# Patient Record
Sex: MALE | Race: OTHER | NOT HISPANIC OR LATINO
[De-identification: names, ages, dates, MRNs, and addresses within clinical notes are randomized per-mention and may not be internally consistent; named-entity substitution may affect disease eponyms.]

---
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
## 2025-01-25 NOTE — PROGRESS NOTE PEDS - PROBLEM SELECTOR PROBLEM 4
01/25/25 0900   Team Meeting   Meeting Type Daily Rounds   Team Members Present   Team Members Present Physician;Nurse   Physician Team Member Di/Yury   Nursing Team Member Nicole   Patient/Family Present   Patient Present No   Patient's Family Present No       AM rounds- denies SI/HI, ongoing depression. Flat affect. Social with select peers. Declined groups. Slept well.    Febrile neutropenia

## 2025-03-04 NOTE — PROGRESS NOTE PEDS - PROBLEM SELECTOR PLAN 2
-Hold 6MP and MTX maintenance chemotherapy   - Continue ppx Acyclovir and Pentamidine (1/10) - Hold 6MP and MTX maintenance chemotherapy   - Continue ppx Acyclovir and Pentamidine (1/10) fair balance